# Patient Record
Sex: MALE | Race: WHITE | Employment: UNEMPLOYED | URBAN - METROPOLITAN AREA
[De-identification: names, ages, dates, MRNs, and addresses within clinical notes are randomized per-mention and may not be internally consistent; named-entity substitution may affect disease eponyms.]

---
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
## 2021-01-11 ENCOUNTER — TELEPHONE (OUTPATIENT)
Dept: FAMILY MEDICINE CLINIC | Age: 21
End: 2021-01-11

## 2021-01-11 DIAGNOSIS — U07.1 COVID-19: Primary | ICD-10-CM

## 2021-01-11 NOTE — TELEPHONE ENCOUNTER
D/W patient. This usually goes through the team docs. At [facility] for example, all athletes go through the team doc. They do an EKG, ECHO, and troponin levels. He will contact his team doc and if they have no policy and procedure, I will order a 2D ECHO, troponins, and an EKG after we do a virtual visit. He will call us if he needs a virtual visit to set all of this up if he does not get anything going with his team doctor.

## 2021-01-11 NOTE — TELEPHONE ENCOUNTER
Pt states he cannot be seen by the team doctor. Pt states he would like a call back from Dr Adams before scheduling a virtual visit. Please advise.

## 2021-01-11 NOTE — TELEPHONE ENCOUNTER
Pt just tested positive for the coronavirus on 1/6/2021 and his school physician wants him to have an EKG and Cardiology Exam. He knows that he can't have that done now because he has to quarantine for 10 days but would like to know what he needs to do to get scheduled. Please advise.

## 2021-01-12 NOTE — TELEPHONE ENCOUNTER
telling him to get EKG to expedite return to play. Athletic training staff will still clear patient once he gets to college and sees their doctor. I explained I can order the test for him and assess the results but I will not be seeing or clearing him based on him going through his team docs. This is appropriate. If he wants us to clear him, we would have to see him. I am not sure his  understands what is actually going on since he was not aware that he needed an EKG post covid and he is trying to push to get this patient cleared ASAP. Since he was diagnosed with COVID and 10 days would be 1/16/2021, the patient cannot go to get his EKG until then. He has had an asymptomatic infection by his report. He was screened at school and says since then he took a covid test on his own and was negative.

## 2021-01-16 ENCOUNTER — TELEPHONE (OUTPATIENT)
Dept: FAMILY MEDICINE CLINIC | Age: 21
End: 2021-01-16

## 2021-01-16 ENCOUNTER — HOSPITAL ENCOUNTER (OUTPATIENT)
Dept: LAB | Age: 21
Discharge: HOME | End: 2021-01-16
Payer: COMMERCIAL

## 2021-01-16 DIAGNOSIS — U07.1 COVID-19: ICD-10-CM

## 2021-01-16 PROCEDURE — 93005 ELECTROCARDIOGRAM TRACING: CPT

## 2021-01-16 NOTE — TELEPHONE ENCOUNTER
After hours phone call :    Mr. Harini Wayne called the answering service on 1/16/2021 complaining of requesting EKG results. Patient identified using 2 patient identifiers. Reports results are needed to return to school at [employer] and [facility]. Informed EKG performed this morning has not been finalized. Once EKG results are finalized results will be sent to PCP.     Message sent to patient's PCP Kamaljit Herrmann MD

## 2021-01-17 LAB
ATRIAL RATE: 50 BPM
CALCULATED P AXIS, ECG09: 24 DEGREES
CALCULATED R AXIS, ECG10: 15 DEGREES
CALCULATED T AXIS, ECG11: 36 DEGREES
DIAGNOSIS, 93000: NORMAL
P-R INTERVAL, ECG05: 146 MS
Q-T INTERVAL, ECG07: 398 MS
QRS DURATION, ECG06: 90 MS
QTC CALCULATION (BEZET), ECG08: 362 MS
VENTRICULAR RATE, ECG03: 50 BPM

## 2022-10-28 ENCOUNTER — TELEPHONE (OUTPATIENT)
Dept: FAMILY MEDICINE CLINIC | Age: 22
End: 2022-10-28

## 2022-10-28 NOTE — TELEPHONE ENCOUNTER
----- Message from Kalie Hernández sent at 10/28/2022 11:38 AM EDT -----  Subject: Message to Provider    QUESTIONS  Information for Provider? Patient is wanting to speak to the office about   a couple of shots for his shot record.   ---------------------------------------------------------------------------  --------------  [id number]  [phone]; OK to leave message on voicemail  ---------------------------------------------------------------------------  --------------  SCRIPT ANSWERS  Relationship to Patient?  Self